# Patient Record
Sex: FEMALE | Race: WHITE | NOT HISPANIC OR LATINO | URBAN - METROPOLITAN AREA
[De-identification: names, ages, dates, MRNs, and addresses within clinical notes are randomized per-mention and may not be internally consistent; named-entity substitution may affect disease eponyms.]

---

## 2017-05-28 ENCOUNTER — EMERGENCY (EMERGENCY)
Facility: HOSPITAL | Age: 72
LOS: 1 days | Discharge: HOME | End: 2017-05-28
Attending: EMERGENCY MEDICINE | Admitting: EMERGENCY MEDICINE
Payer: MEDICARE

## 2017-05-28 VITALS
SYSTOLIC BLOOD PRESSURE: 139 MMHG | OXYGEN SATURATION: 99 % | TEMPERATURE: 98 F | RESPIRATION RATE: 16 BRPM | HEART RATE: 95 BPM | DIASTOLIC BLOOD PRESSURE: 85 MMHG

## 2017-05-28 DIAGNOSIS — M79.644 PAIN IN RIGHT FINGER(S): ICD-10-CM

## 2017-05-28 DIAGNOSIS — M65.331 TRIGGER FINGER, RIGHT MIDDLE FINGER: ICD-10-CM

## 2017-05-28 PROCEDURE — 99282 EMERGENCY DEPT VISIT SF MDM: CPT

## 2017-05-28 NOTE — ED PROVIDER NOTE - OBJECTIVE STATEMENT
70 y/o Female with a hx of trigger finger to the right 3rd finger. Patient states he was doing some house work and her right 3rd finger was stuck and was unable to put back in place. Patient here to reset finger. Denies skin break, numbness and tingling

## 2017-05-28 NOTE — ED PROVIDER NOTE - NS ED MD SCRIBE ATTENDING SCRIBE SECTIONS
VITAL SIGNS( Pullset)/HISTORY OF PRESENT ILLNESS/PHYSICAL EXAM/RESULTS/HIV/REVIEW OF SYSTEMS/DISPOSITION/PAST MEDICAL/SURGICAL/SOCIAL HISTORY/PROGRESS NOTE

## 2017-05-28 NOTE — ED ADULT TRIAGE NOTE - CHIEF COMPLAINT QUOTE
Patient presents to the ED with right 3rd digit stiffness x 30 minutes. Patient states "finger is stuck and can not put it back to original way." Cap refill <2 seconds. No swelling noted. Denies numbness/tingling.

## 2017-05-28 NOTE — ED PROVIDER NOTE - MEDICAL DECISION MAKING DETAILS
Patient with trigger finger as reduced in the ER, finger splint applied, stable for discharge.   The scribe's documentation has been prepared under my direction and personally reviewed by me in its entirety. I confirm that the note above accurately reflects all work, treatment, procedures, and medical decision making performed by me.

## 2017-05-28 NOTE — ED PROCEDURE NOTE - NS ED PERI VASCULAR NEG
no cyanosis of extremity/capillary refill time < 2 seconds/fingers/toes warm to touch/no swelling/no paresthesia

## 2017-12-06 ENCOUNTER — EMERGENCY (EMERGENCY)
Facility: HOSPITAL | Age: 72
LOS: 1 days | Discharge: HOME | End: 2017-12-06
Admitting: EMERGENCY MEDICINE
Payer: MEDICARE

## 2017-12-06 VITALS
RESPIRATION RATE: 17 BRPM | HEART RATE: 80 BPM | OXYGEN SATURATION: 98 % | TEMPERATURE: 98 F | SYSTOLIC BLOOD PRESSURE: 150 MMHG | HEIGHT: 62 IN | WEIGHT: 132.06 LBS | DIASTOLIC BLOOD PRESSURE: 87 MMHG

## 2017-12-06 DIAGNOSIS — Z88.2 ALLERGY STATUS TO SULFONAMIDES: ICD-10-CM
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
## 2017-12-06 DIAGNOSIS — S61.217A LACERATION WITHOUT FOREIGN BODY OF LEFT LITTLE FINGER WITHOUT DAMAGE TO NAIL, INITIAL ENCOUNTER: ICD-10-CM
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
## 2017-12-06 DIAGNOSIS — Z88.8 ALLERGY STATUS TO OTHER DRUGS, MEDICAMENTS AND BIOLOGICAL SUBSTANCES: ICD-10-CM
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
## 2017-12-06 DIAGNOSIS — S61.207A UNSPECIFIED OPEN WOUND OF LEFT LITTLE FINGER WITHOUT DAMAGE TO NAIL, INITIAL ENCOUNTER: ICD-10-CM
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
## 2017-12-06 PROCEDURE — 99283 EMERGENCY DEPT VISIT LOW MDM: CPT

## 2017-12-06 NOTE — ED PROVIDER NOTE - OBJECTIVE STATEMENT
72y F presents to ED c/o laceration to left 5th digit. Pt states she was using scissors last night when she cut her left pinky, causing it to bleed profusely. Pt states she put the pinky under cold water and dressed the finger with cotton balls and band-aids. Pt tried taking the cotton ball off this morning, but some of the cotton was stuck on the wound, which was still bleeding. Pt notes she took 2 Tylenol this morning for an unrelated headache. Denies any nail involvement. States she is UTD on tetanus vaccination.

## 2018-10-29 NOTE — ED PROVIDER NOTE - CROS ED ENDOCRINE ALL NEG
Followed by Hematology as outpatient - last seen in 2017   Consider Hematology follow up as outpatient     negative...

## 2021-09-29 NOTE — ED PROCEDURE NOTE - CPROC ED INFORMED CONSENT1
Benefits, risks, and possible complications of procedure explained to patient/caregiver who verbalized understanding and gave verbal consent.
no

## 2023-05-08 NOTE — ED ADULT NURSE NOTE - SKIN INTEGRITY
Problem: Chronic Conditions and Co-morbidities  Goal: Patient's chronic conditions and co-morbidity symptoms are monitored and maintained or improved  Outcome: Progressing see note
